# Patient Record
(demographics unavailable — no encounter records)

---

## 2024-12-05 NOTE — HISTORY OF PRESENT ILLNESS
[Dexcom] : Dexcom [FreeTextEntry1] : DM type: 1 Severity: severe Duration:35 years  Associated symptoms or complications: none  Insulin pump, on t-slim Current regimen: novolog see pump flow sheet    Current control: improving  pt. monitors her blood sugars typically 4-6 times daily, in addition to using her CGMS device   PMH: post-ablative hypothyroidism 2000, on levoxyl .137  under stress-mother living with her s/p hip fracture       [FreeTextEntry2] : 71 [FreeTextEntry3] : 28 [FreeTextEntry4] : 1 [de-identified] : 7.0 [FreeTextEntry5] : 155 [FreeTextEntry6] : 37

## 2024-12-05 NOTE — ASSESSMENT
[FreeTextEntry1] : DM type 1, on pump therapy, well controlled.  COuld do better with a meal bolus rather than relying on autocorrect Hypothyroid, well replaced

## 2025-06-26 NOTE — HISTORY OF PRESENT ILLNESS
[Dexcom] : Dexcom [FreeTextEntry1] : DM type: 1 Severity: severe Duration:36 years  Associated symptoms or complications: none  Insulin pump, on t-slim Current regimen: novolog see pump flow sheet    Current control: struggling with diabetes emotionally  pt. monitors her blood sugars typically 4-6 times daily, in addition to using her CGMS device   PMH: post-ablative hypothyroidism 2000, on levoxyl .137  under stress-mother living with her s/p hip fracture       [FreeTextEntry2] : 59 [FreeTextEntry3] : 41 [FreeTextEntry4] : 2 [de-identified] : 7.7 [FreeTextEntry5] : 184 [FreeTextEntry6] : 49

## 2025-06-26 NOTE — ASSESSMENT
[FreeTextEntry1] : DM type 1, on pump therapy, losing control. follow up with CDE; COuld do better with a meal bolus rather than relying on autocorrect Hypothyroid, well replaced